# Patient Record
Sex: FEMALE | Race: WHITE
[De-identification: names, ages, dates, MRNs, and addresses within clinical notes are randomized per-mention and may not be internally consistent; named-entity substitution may affect disease eponyms.]

---

## 2023-10-28 ENCOUNTER — HOSPITAL ENCOUNTER (EMERGENCY)
Dept: HOSPITAL 97 - ER | Age: 38
Discharge: HOME | End: 2023-10-28
Payer: SELF-PAY

## 2023-10-28 VITALS — SYSTOLIC BLOOD PRESSURE: 115 MMHG | DIASTOLIC BLOOD PRESSURE: 79 MMHG

## 2023-10-28 VITALS — OXYGEN SATURATION: 99 %

## 2023-10-28 DIAGNOSIS — S01.81XA: Primary | ICD-10-CM

## 2023-10-28 PROCEDURE — 70450 CT HEAD/BRAIN W/O DYE: CPT

## 2023-10-28 PROCEDURE — 99284 EMERGENCY DEPT VISIT MOD MDM: CPT

## 2023-10-28 PROCEDURE — 72125 CT NECK SPINE W/O DYE: CPT

## 2023-10-28 PROCEDURE — 0HQ1XZZ REPAIR FACE SKIN, EXTERNAL APPROACH: ICD-10-PCS
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
## 2023-10-28 NOTE — RAD REPORT
EXAM DESCRIPTION:  CT - Head C Spine Mpr Wo Con - 10/28/2023 6:48 am

 

 

CT Cervical Spine Without Contrast

 

CLINICAL HISTORY:  Trauma

 

COMPARISON:  None.

 

TECHNIQUE:  Head/brain and cervical spine axial images acquired without contrast. Coronal and sagitta
l reformats created. Exam performed according to departmental dose-optimization program which include
s automated exposure control, adjustment of mA and/or kV according to patient size, and/or use of ite
rative reconstruction technique.

 

FINDINGS:  Head/Brain-

No midline shift, mass effect, intracranial hemorrhage, or hydrocephalus.

Brain parenchyma unremarkable.

Paranasal sinuses clear. Left frontal sinus very small (normal variant). Mastoid air cells clear.

No skull fracture or significant skull lesion.

Small left frontal scalp laceration.

Cervical Spine-

Straightening of cervical vertebral bodies may be due to patient positioning or muscle spasm.

No fracture or subluxation.

Mild, atlantodental, degenerative joint changes.

Cervical disc spaces unremarkable.

No significant central canal or neuroforaminal stenosis.

No paraspinal hematoma.

 

IMPRESSION:  1. Head/Brain- Unremarkable.

2. Cervical Spine- Unremarkable.

 

Electronically signed by:   Chico Jones MD   10/28/2023 3:56 AM CDT Workstation: [id number]

 

 

 

 

 

Due to temporary technical issues with the PACS/Fluency reporting system, reports are being signed by
 the in house radiologists without review as a courtesy to insure prompt reporting. The interpreting 
radiologist is fully responsible for the content of the report.
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
